# Patient Record
Sex: FEMALE | Race: WHITE | NOT HISPANIC OR LATINO | URBAN - NONMETROPOLITAN AREA
[De-identification: names, ages, dates, MRNs, and addresses within clinical notes are randomized per-mention and may not be internally consistent; named-entity substitution may affect disease eponyms.]

---

## 2018-12-26 PROBLEM — H40.1134: Noted: 2018-12-26

## 2019-05-06 ENCOUNTER — IMPORTED ENCOUNTER (OUTPATIENT)
Dept: URBAN - NONMETROPOLITAN AREA CLINIC 1 | Facility: CLINIC | Age: 69
End: 2019-05-06

## 2019-05-06 PROCEDURE — 92014 COMPRE OPH EXAM EST PT 1/>: CPT

## 2019-05-06 NOTE — PATIENT DISCUSSION
COAG- NP GL Eval 12/26/18 former Dr. Kelly Borges pt-Continue drops in plan as directed. Stressed importance of compliance. - Continue Latanoprost .005% QHS - C/D's are 0.8-0.9 OD 0.9 OS - IOP's today were 18 OD 21OS-Order baseline VF 27-2**of note significant right side head tilt; Dr's Notes: PCP Malcolm Cartagena OCT - 12/26/18Gonio- 12/26/18

## 2020-07-30 ENCOUNTER — IMPORTED ENCOUNTER (OUTPATIENT)
Dept: URBAN - NONMETROPOLITAN AREA CLINIC 1 | Facility: CLINIC | Age: 70
End: 2020-07-30

## 2020-07-30 PROBLEM — H25.13: Noted: 2020-07-30

## 2020-07-30 PROBLEM — H40.1134: Noted: 2018-12-26

## 2020-07-30 PROBLEM — E11.9: Noted: 2020-07-30

## 2020-07-30 PROCEDURE — 92014 COMPRE OPH EXAM EST PT 1/>: CPT

## 2020-07-30 NOTE — PATIENT DISCUSSION
COAG- Continue Latanoprost .005% QHS ou- C/D's are 0.8-0.9 OD 0.9 OS - IOP's today stable ou-monitor for iop changesCataract OU-Not yet surgical. -Reviewed symptoms of advancing cataract growth such as glare and halos and decreased vision.-Continue to monitor for now. Pt will notify us if any new symptoms develop. DM s DR-Stressed the importance of keeping blood sugars under control blood pressure under control and weight normalization and regular visits with PCP. -Explained the possible effects of poorly controlled diabetes and the damage that diabetes can cause to ocular health. -Patient to check HgbA1C.-Pt instructed to contact our office with any vision changes.; Dr's Notes: PCP Alyssia Palomo - 12/26/18Bianca- 12/26/18

## 2021-01-06 ENCOUNTER — IMPORTED ENCOUNTER (OUTPATIENT)
Dept: URBAN - NONMETROPOLITAN AREA CLINIC 1 | Facility: CLINIC | Age: 71
End: 2021-01-06

## 2021-01-06 PROCEDURE — 92083 EXTENDED VISUAL FIELD XM: CPT

## 2021-01-06 PROCEDURE — 92014 COMPRE OPH EXAM EST PT 1/>: CPT

## 2021-01-06 NOTE — PATIENT DISCUSSION
COAG- Continue Latanoprost .005% QHS ou- C/D's are 0.8-0.9 OD 0.9 OS - IOP's today stable ou-monitor for iop changes-vf today /stable ouCataract OU-Not yet surgical. -Reviewed symptoms of advancing cataract growth such as glare and halos and decreased vision.-Continue to monitor for now. Pt will notify us if any new symptoms develop. DM s DR-Stressed the importance of keeping blood sugars under control blood pressure under control and weight normalization and regular visits with PCP. -Explained the possible effects of poorly controlled diabetes and the damage that diabetes can cause to ocular health. -Patient to check HgbA1C.-Pt instructed to contact our office with any vision changes.; Dr's Notes: PCP Amrita Her - 12/26/18Bianca- 12/26/18

## 2021-07-14 ENCOUNTER — PREPPED CHART (OUTPATIENT)
Dept: RURAL CLINIC 1 | Facility: CLINIC | Age: 71
End: 2021-07-14

## 2021-07-14 ENCOUNTER — IMPORTED ENCOUNTER (OUTPATIENT)
Dept: URBAN - NONMETROPOLITAN AREA CLINIC 1 | Facility: CLINIC | Age: 71
End: 2021-07-14

## 2021-07-14 PROCEDURE — 92133 CPTRZD OPH DX IMG PST SGM ON: CPT

## 2021-07-14 PROCEDURE — 99213 OFFICE O/P EST LOW 20 MIN: CPT

## 2021-07-14 NOTE — PATIENT DISCUSSION
COAG- Continue Latanoprost .005% QHS ou- C/D's are 0.8-0.9 OD 0.9 OS - IOP's today stable ou-monitor for iop changes-oct today /stable ouCataract OU-Not yet surgical. -Reviewed symptoms of advancing cataract growth such as glare and halos and decreased vision.-Continue to monitor for now. Pt will notify us if any new symptoms develop. DM s DR-Stressed the importance of keeping blood sugars under control blood pressure under control and weight normalization and regular visits with PCP. -Explained the possible effects of poorly controlled diabetes and the damage that diabetes can cause to ocular health. -Patient to check HgbA1C.-Pt instructed to contact our office with any vision changes.; Dr's Notes: PCP Ghazal Quezada - 12/26/18Bianca- 12/26/18

## 2022-02-16 ASSESSMENT — VISUAL ACUITY
OD_SC: 20/30
OS_SC: 20/30

## 2022-02-16 ASSESSMENT — TONOMETRY
OS_IOP_MMHG: 26
OD_IOP_MMHG: 22

## 2022-02-23 ENCOUNTER — COMPREHENSIVE EXAM (OUTPATIENT)
Dept: RURAL CLINIC 1 | Facility: CLINIC | Age: 72
End: 2022-02-23

## 2022-02-23 DIAGNOSIS — H40.1134: ICD-10-CM

## 2022-02-23 DIAGNOSIS — H25.89: ICD-10-CM

## 2022-02-23 PROCEDURE — 92014 COMPRE OPH EXAM EST PT 1/>: CPT

## 2022-02-23 ASSESSMENT — TONOMETRY
OD_IOP_MMHG: 20
OS_IOP_MMHG: 20

## 2022-04-15 ASSESSMENT — VISUAL ACUITY
OD_CC: 20/30 PICTURES
OS_SC: 20/30
OD_SC: 20/30
OS_CC: 20/30 PICTURES

## 2022-04-15 ASSESSMENT — TONOMETRY
OD_IOP_MMHG: 18
OD_IOP_MMHG: 16
OS_IOP_MMHG: 16
OD_IOP_MMHG: 22
OS_IOP_MMHG: 16
OS_IOP_MMHG: 21
OS_IOP_MMHG: 26
OD_IOP_MMHG: 17

## 2022-08-24 ENCOUNTER — FOLLOW UP (OUTPATIENT)
Dept: RURAL CLINIC 1 | Facility: CLINIC | Age: 72
End: 2022-08-24

## 2022-08-24 DIAGNOSIS — H40.1134: ICD-10-CM

## 2022-08-24 DIAGNOSIS — E11.9: ICD-10-CM

## 2022-08-24 DIAGNOSIS — H25.89: ICD-10-CM

## 2022-08-24 PROCEDURE — 99213 OFFICE O/P EST LOW 20 MIN: CPT

## 2022-08-24 PROCEDURE — 92083 EXTENDED VISUAL FIELD XM: CPT

## 2022-08-24 ASSESSMENT — VISUAL ACUITY
OD_SC: 20/25
OU_SC: 20/30
OU_SC: 20/25
OS_SC: 20/25

## 2022-08-24 ASSESSMENT — TONOMETRY
OD_IOP_MMHG: 17
OS_IOP_MMHG: 17

## 2023-04-20 ENCOUNTER — ESTABLISHED PATIENT (OUTPATIENT)
Dept: RURAL CLINIC 1 | Facility: CLINIC | Age: 73
End: 2023-04-20

## 2023-04-20 DIAGNOSIS — H40.1134: ICD-10-CM

## 2023-04-20 DIAGNOSIS — H25.89: ICD-10-CM

## 2023-04-20 DIAGNOSIS — E11.9: ICD-10-CM

## 2023-04-20 PROCEDURE — 92133 CPTRZD OPH DX IMG PST SGM ON: CPT

## 2023-04-20 PROCEDURE — 92014 COMPRE OPH EXAM EST PT 1/>: CPT

## 2023-04-20 ASSESSMENT — VISUAL ACUITY
OU_SC: 20/40
OS_SC: 20/50
OD_CC: 20/25
OS_CC: 20/25
OD_SC: 20/40
OU_CC: 20/25

## 2023-04-20 ASSESSMENT — TONOMETRY
OD_IOP_MMHG: 15
OS_IOP_MMHG: 15

## 2023-10-18 ENCOUNTER — FOLLOW UP (OUTPATIENT)
Dept: RURAL CLINIC 1 | Facility: CLINIC | Age: 73
End: 2023-10-18

## 2023-10-18 DIAGNOSIS — H40.1132: ICD-10-CM

## 2023-10-18 DIAGNOSIS — E11.9: ICD-10-CM

## 2023-10-18 DIAGNOSIS — H25.89: ICD-10-CM

## 2023-10-18 PROCEDURE — 99214 OFFICE O/P EST MOD 30 MIN: CPT

## 2023-10-18 PROCEDURE — 92083 EXTENDED VISUAL FIELD XM: CPT

## 2023-10-18 ASSESSMENT — VISUAL ACUITY
OD_CC: 20/30
OS_CC: 20/30

## 2023-10-18 ASSESSMENT — TONOMETRY
OS_IOP_MMHG: 16
OD_IOP_MMHG: 16

## 2024-03-12 ENCOUNTER — FOLLOW UP (OUTPATIENT)
Dept: RURAL CLINIC 1 | Facility: CLINIC | Age: 74
End: 2024-03-12

## 2024-03-12 DIAGNOSIS — H25.89: ICD-10-CM

## 2024-03-12 DIAGNOSIS — E11.9: ICD-10-CM

## 2024-03-12 DIAGNOSIS — H40.1132: ICD-10-CM

## 2024-03-12 PROCEDURE — 92014 COMPRE OPH EXAM EST PT 1/>: CPT

## 2024-03-12 PROCEDURE — 92133 CPTRZD OPH DX IMG PST SGM ON: CPT

## 2024-03-12 ASSESSMENT — TONOMETRY
OD_IOP_MMHG: 15
OS_IOP_MMHG: 15

## 2024-03-12 ASSESSMENT — VISUAL ACUITY
OD_SC: 20/50
OS_SC: 20/40

## 2024-10-16 ENCOUNTER — FOLLOW UP (OUTPATIENT)
Dept: RURAL CLINIC 1 | Facility: CLINIC | Age: 74
End: 2024-10-16

## 2024-10-16 DIAGNOSIS — H40.1132: ICD-10-CM

## 2024-10-16 DIAGNOSIS — H25.89: ICD-10-CM

## 2024-10-16 DIAGNOSIS — E11.9: ICD-10-CM

## 2024-10-16 PROCEDURE — 99214 OFFICE O/P EST MOD 30 MIN: CPT

## 2024-10-16 PROCEDURE — 92083 EXTENDED VISUAL FIELD XM: CPT

## 2025-05-01 ENCOUNTER — FOLLOW UP (OUTPATIENT)
Age: 75
End: 2025-05-01

## 2025-05-01 DIAGNOSIS — H40.1132: ICD-10-CM

## 2025-05-01 DIAGNOSIS — H25.89: ICD-10-CM

## 2025-05-01 DIAGNOSIS — E11.9: ICD-10-CM

## 2025-05-01 PROCEDURE — 92133 CPTRZD OPH DX IMG PST SGM ON: CPT

## 2025-05-01 PROCEDURE — 92014 COMPRE OPH EXAM EST PT 1/>: CPT
